# Patient Record
Sex: MALE | Race: WHITE | NOT HISPANIC OR LATINO | Employment: OTHER | URBAN - METROPOLITAN AREA
[De-identification: names, ages, dates, MRNs, and addresses within clinical notes are randomized per-mention and may not be internally consistent; named-entity substitution may affect disease eponyms.]

---

## 2020-05-04 ENCOUNTER — CONSULT (OUTPATIENT)
Dept: CARDIOLOGY | Facility: CLINIC | Age: 70
End: 2020-05-04

## 2020-05-04 VITALS
HEART RATE: 68 BPM | SYSTOLIC BLOOD PRESSURE: 130 MMHG | TEMPERATURE: 97.8 F | BODY MASS INDEX: 30.77 KG/M2 | WEIGHT: 227.2 LBS | DIASTOLIC BLOOD PRESSURE: 74 MMHG | HEIGHT: 72 IN

## 2020-05-04 DIAGNOSIS — I10 ESSENTIAL HYPERTENSION: ICD-10-CM

## 2020-05-04 DIAGNOSIS — I42.8 NICM (NONISCHEMIC CARDIOMYOPATHY) (HCC): ICD-10-CM

## 2020-05-04 DIAGNOSIS — I44.7 LBBB (LEFT BUNDLE BRANCH BLOCK): ICD-10-CM

## 2020-05-04 DIAGNOSIS — R55 NEAR SYNCOPE: Primary | ICD-10-CM

## 2020-05-04 PROCEDURE — 99203 OFFICE O/P NEW LOW 30 MIN: CPT | Performed by: INTERNAL MEDICINE

## 2020-05-04 PROCEDURE — 93000 ELECTROCARDIOGRAM COMPLETE: CPT | Performed by: INTERNAL MEDICINE

## 2020-05-04 RX ORDER — SODIUM PHOSPHATE,MONO-DIBASIC 19G-7G/118
ENEMA (ML) RECTAL
COMMUNITY

## 2020-05-04 RX ORDER — LISINOPRIL 20 MG/1
20 TABLET ORAL DAILY
COMMUNITY

## 2020-05-04 RX ORDER — TAMSULOSIN HYDROCHLORIDE 0.4 MG/1
1 CAPSULE ORAL DAILY
COMMUNITY

## 2020-05-04 RX ORDER — MULTIPLE VITAMINS W/ MINERALS TAB 9MG-400MCG
1 TAB ORAL DAILY
COMMUNITY

## 2020-05-04 RX ORDER — METOPROLOL SUCCINATE 100 MG/1
50 TABLET, EXTENDED RELEASE ORAL DAILY
COMMUNITY

## 2020-05-04 RX ORDER — FINASTERIDE 5 MG/1
5 TABLET, FILM COATED ORAL DAILY
COMMUNITY

## 2020-05-04 RX ORDER — DICLOFENAC SODIUM AND MISOPROSTOL 75; 200 MG/1; UG/1
1 TABLET, DELAYED RELEASE ORAL 2 TIMES DAILY
COMMUNITY

## 2020-05-04 RX ORDER — ASCORBIC ACID 500 MG
500 TABLET ORAL DAILY
COMMUNITY

## 2020-05-04 NOTE — PROGRESS NOTES
Subjective:     Encounter Date:05/04/2020    Primary Care Physician: Shemar Novoa MD      Patient ID: Luis Alfredo Kc is a 69 y.o. male.    Chief Complaint:Cardiomyopathy    PROBLEM LIST:  1. NICM  a. 2/2010 [facility] EF 25%. Normal coronary arteries.  b. 7/2012 EF 40-45%.  2. Chronic left bundle branch block  3. Recurrent near syncope  a. 48 hour holter pending  4. Hypertension  5. Arthritis  6. Anxiety  7. BPH/Prostatitis   8. Surgeries:  a. Tonsillectomy       No Known Allergies      Current Outpatient Medications:   •  diclofenac-miSOPROStol (ARTHROTEC 75) 75-0.2 MG EC tablet, Take 1 tablet by mouth 2 (Two) Times a Day., Disp: , Rfl:   •  finasteride (PROSCAR) 5 MG tablet, Take 5 mg by mouth Daily., Disp: , Rfl:   •  glucosamine-chondroitin 500-400 MG capsule capsule, Take  by mouth 3 (Three) Times a Day With Meals., Disp: , Rfl:   •  lisinopril (PRINIVIL,ZESTRIL) 20 MG tablet, Take 20 mg by mouth Daily., Disp: , Rfl:   •  metoprolol succinate XL (TOPROL-XL) 100 MG 24 hr tablet, Take 50 mg by mouth Daily., Disp: , Rfl:   •  Multiple Vitamins-Minerals (MULTIVITAMIN WITH MINERALS) tablet tablet, Take 1 tablet by mouth Daily., Disp: , Rfl:   •  tamsulosin (FLOMAX) 0.4 MG capsule 24 hr capsule, Take 1 capsule by mouth Daily., Disp: , Rfl:   •  vitamin C (ASCORBIC ACID) 500 MG tablet, Take 500 mg by mouth Daily., Disp: , Rfl:         History of Present Illness    Patient is a 69-year-old  male who we are seeing today for further evaluation of recurrent near syncopal events.  He has previous history of nonischemic cardiomyopathy with most recent echocardiogram he thinks around 2 years ago with near normal LVEF.  He also has history of noted chronic left bundle branch block.  Patient notes recently he was in [place] and diagnosed with prostatitis.  With this some of his medication was adjusted and his finasteride was discontinued.  Once returning back to [place] he followed up with his primary care physician  who reinitiated this medication.  He notes few days after this he was sitting at his computer and felt as if he possibly dozed off or passed out.  Patient notes that he felt this had just fall forward and then reawoke.  He is unsure what happened.  Does not really note any pre-occurring symptoms.  Patient notes that about a week later while he was standing he had a recurrent episode similar to this.  He then followed up with his primary care physician for further evaluation.  He was evaluated via telehealth.  And his metoprolol was decreased to half of his regular dose.  Patient notes since that time blood pressure has been somewhat elevated.  He is also had some recurrent lower abdominal discomfort and complaints of constipation.  Patient notes that last evening his blood pressure spiked with systolic of 199.  He contacted EMS who came to his house.  By that time his systolic blood pressure was down to 160.  He felt back to his baseline.  He remained home and did not pursue further evaluation as he knew that he had this appointment today.  He denies any chest pain, pressure, tightness.  Denies any increased shortness of breath.  Denies any recent edema.  He has not noted any bradycardia when checking his blood pressure at home.  Patient was given a 48-hour Holter by his primary care physician which he just returned on Saturday and we do not currently have results of.  Patient notes that he did not have any near syncopal symptoms while wearing the monitor.    The following portions of the patient's history were reviewed and updated as appropriate: allergies, current medications, past family history, past medical history, past social history, past surgical history and problem list.    Family History   Problem Relation Age of Onset   • Heart disease Mother    • Arrhythmia Mother    • Heart disease Brother    • Arrhythmia Brother        Social History     Tobacco Use   • Smoking status: Former Smoker     Years: 30.00  "    Last attempt to quit: 1986     Years since quittin.0   • Smokeless tobacco: Never Used   Substance Use Topics   • Alcohol use: Not Currently   • Drug use: Never         Review of Systems   Constitution: Negative for fever and malaise/fatigue.   HENT: Negative for nosebleeds.    Eyes: Negative for redness and visual disturbance.   Cardiovascular: Negative for chest pain, orthopnea, palpitations and paroxysmal nocturnal dyspnea.   Respiratory: Positive for snoring. Negative for cough, sputum production and wheezing.    Hematologic/Lymphatic: Negative for bleeding problem.   Skin: Negative for flushing, itching and rash.   Musculoskeletal: Positive for arthritis and joint pain. Negative for falls and muscle cramps.   Gastrointestinal: Positive for abdominal pain, constipation and nausea. Negative for diarrhea, heartburn and vomiting.   Genitourinary: Negative for hematuria.   Neurological: Negative for excessive daytime sleepiness, dizziness, headaches, tremors and weakness.   Psychiatric/Behavioral: Negative for substance abuse. The patient is not nervous/anxious.           Objective:   /74 (BP Location: Right arm, Patient Position: Sitting)   Pulse 68   Temp 97.8 °F (36.6 °C)   Ht 181.6 cm (71.5\")   Wt 103 kg (227 lb 3.2 oz)   BMI 31.25 kg/m²         Physical Exam   Constitutional: He is oriented to person, place, and time. He appears well-developed and well-nourished. No distress.   HENT:   Head: Normocephalic and atraumatic.   Eyes: Right eye exhibits no discharge. Left eye exhibits no discharge.   Neck: No JVD present. No tracheal deviation present.   Cardiovascular: Normal rate, regular rhythm, normal heart sounds and intact distal pulses. Exam reveals no friction rub.   No murmur heard.  Pulmonary/Chest: Effort normal and breath sounds normal. No respiratory distress.   Abdominal: Soft. Bowel sounds are normal. There is no tenderness.   Musculoskeletal: He exhibits no edema or deformity. "   Neurological: He is alert and oriented to person, place, and time.   Skin: Skin is warm and dry.         ECG 12 Lead  Date/Time: 5/4/2020 9:48 AM  Performed by: Michelet Polk MD  Authorized by: Michelet Polk MD   Comparison: compared with previous ECG from 4/9/2018  Similar to previous ECG  Rhythm: sinus rhythm  Conduction: left bundle branch block    Clinical impression: abnormal EKG                  Assessment:   Assessment/Plan      Luis Alfredo was seen today for cardiomyopathy.    Diagnoses and all orders for this visit:    Near syncope  -     ECG 12 Lead    LBBB (left bundle branch block)    NICM (nonischemic cardiomyopathy) (CMS/[facility])    Essential hypertension      Assessment:  1. Near syncope, unclear etiology. Known LBBB and on beta blocker that was recently reduced. Could be related to recent medication adjustments.  2. Left bundle branch block, chronic  3. History of nonischemic cardiomyopathy, by report last echo with near normal LVEF.  4. Hypertension, beta blocker recently reduced with recent episode of significant hypertension in setting of significant pain.      Plan:  1. Will attempt to get Holter report once resulted. Currently not available.  2. Will check echo in the near term to evaluate LVEF.  3. If no abnormal findings on Holter and patient has recurrent symptoms will attempt to get 30 day MCOT.  4. Continue current medications per current dosing.  5. Follow-up in 3 months time or sooner if needed.       Madelin WHALEY scribed this dictation for Dr. Michelet Polk.    Dictated utilizing Dragon dictation

## 2020-08-11 DIAGNOSIS — I42.8 NICM (NONISCHEMIC CARDIOMYOPATHY) (HCC): Primary | ICD-10-CM

## 2020-08-12 ENCOUNTER — OFFICE VISIT (OUTPATIENT)
Dept: CARDIOLOGY | Facility: CLINIC | Age: 70
End: 2020-08-12

## 2020-08-12 VITALS
SYSTOLIC BLOOD PRESSURE: 136 MMHG | OXYGEN SATURATION: 95 % | DIASTOLIC BLOOD PRESSURE: 72 MMHG | HEART RATE: 79 BPM | HEIGHT: 71 IN | BODY MASS INDEX: 31.78 KG/M2 | WEIGHT: 227 LBS

## 2020-08-12 DIAGNOSIS — I10 ESSENTIAL HYPERTENSION: ICD-10-CM

## 2020-08-12 DIAGNOSIS — I42.8 NICM (NONISCHEMIC CARDIOMYOPATHY) (HCC): Primary | ICD-10-CM

## 2020-08-12 DIAGNOSIS — R06.09 DYSPNEA ON EXERTION: ICD-10-CM

## 2020-08-12 DIAGNOSIS — I44.7 LBBB (LEFT BUNDLE BRANCH BLOCK): ICD-10-CM

## 2020-08-12 PROCEDURE — 99214 OFFICE O/P EST MOD 30 MIN: CPT | Performed by: NURSE PRACTITIONER

## 2020-08-12 NOTE — PROGRESS NOTES
Subjective:     Encounter Date:08/12/2020    Primary Care Physician: Shemar Novoa MD      Patient ID: Luis Alfredo Kc is a 69 y.o. male.    Chief Complaint:Follow-up    PROBLEM LIST:  1. NICM  a. 2/2010 [facility] EF 25%. Normal coronary arteries.  b. 7/2012 EF 40-45%.  2. Chronic left bundle branch block  3. Recurrent near syncope  a. 48 hour holter normal  4. Hypertension  5. Arthritis  6. Anxiety  7. BPH/Prostatitis   8. Surgeries:  a. Tonsillectomy       No Known Allergies      Current Outpatient Medications:   •  diclofenac-miSOPROStol (ARTHROTEC 75) 75-0.2 MG EC tablet, Take 1 tablet by mouth 2 (Two) Times a Day., Disp: , Rfl:   •  finasteride (PROSCAR) 5 MG tablet, Take 5 mg by mouth Daily., Disp: , Rfl:   •  glucosamine-chondroitin 500-400 MG capsule capsule, Take  by mouth 3 (Three) Times a Day With Meals., Disp: , Rfl:   •  lisinopril (PRINIVIL,ZESTRIL) 20 MG tablet, Take 20 mg by mouth Daily., Disp: , Rfl:   •  metoprolol succinate XL (TOPROL-XL) 100 MG 24 hr tablet, Take 50 mg by mouth Daily., Disp: , Rfl:   •  Multiple Vitamins-Minerals (MULTIVITAMIN WITH MINERALS) tablet tablet, Take 1 tablet by mouth Daily., Disp: , Rfl:   •  tamsulosin (FLOMAX) 0.4 MG capsule 24 hr capsule, Take 1 capsule by mouth Daily., Disp: , Rfl:   •  vitamin C (ASCORBIC ACID) 500 MG tablet, Take 500 mg by mouth Daily., Disp: , Rfl:         History of Present Illness    Patient is a 69-year-old  male who is seen today for follow-up of near syncope.  Since last being seen he had a Holter monitor which resulted as normal.  He has had no further episodes of near syncope.  Denies any chest pain, pressure, tightness.  Does note some increased shortness of breath within the last few months.  Notes that he routinely walks 4 miles but is began to notice some shortness of breath with inclines and stairs.  Denies any worsened edema.  Has not had an ischemic evaluation in roughly 10 years.  Echocardiogram has been ordered but not  "yet performed.  Patient complains also of a dry cough over the course of the last couple of years.    The following portions of the patient's history were reviewed and updated as appropriate: allergies, current medications, past family history, past medical history, past social history, past surgical history and problem list.      Social History     Tobacco Use   • Smoking status: Former Smoker     Years: 30.00     Last attempt to quit: 1986     Years since quittin.2   • Smokeless tobacco: Never Used   Substance Use Topics   • Alcohol use: Not Currently   • Drug use: Never         Review of Systems   Constitution: Negative.   HENT: Positive for hearing loss.    Eyes: Positive for blurred vision.   Cardiovascular: Positive for dyspnea on exertion. Negative for chest pain, leg swelling, palpitations and syncope.   Respiratory: Positive for cough and shortness of breath.    Endocrine: Positive for polyuria.   Hematologic/Lymphatic: Negative for bleeding problem. Bruises/bleeds easily.   Skin: Negative for rash.   Musculoskeletal: Positive for arthritis and joint pain. Negative for muscle weakness and myalgias.   Gastrointestinal: Negative for heartburn, nausea and vomiting.   Neurological: Negative for dizziness, light-headedness, loss of balance and numbness.          Objective:   /72 (BP Location: Right arm)   Pulse 79   Ht 180.3 cm (71\")   Wt 103 kg (227 lb)   SpO2 95%   BMI 31.66 kg/m²         Physical Exam   Constitutional: He is oriented to person, place, and time. He appears well-developed and well-nourished. No distress.   Neck: No JVD present. No tracheal deviation present.   Cardiovascular: Normal rate, regular rhythm, normal heart sounds and intact distal pulses. Exam reveals no friction rub.   No murmur heard.  Pulmonary/Chest: Effort normal and breath sounds normal. No respiratory distress.   Abdominal: Soft. Bowel sounds are normal. There is no tenderness.   Musculoskeletal: He " exhibits no edema or deformity.   Neurological: He is alert and oriented to person, place, and time.   Skin: Skin is warm and dry.       Procedures          Assessment:   Assessment/Plan      Luis Alfredo was seen today for follow-up.    Diagnoses and all orders for this visit:    NICM (nonischemic cardiomyopathy) (CMS/HCC), echo pending.  No current signs of heart failure.  On beta-blocker.    Dyspnea on exertion, slightly worsening with exertion.  No resting dyspnea.    LBBB (left bundle branch block), chronic.    Essential hypertension, controlled on ACE inhibitor.      Plan:  1. Continue current medications.  2. Given risk factors, history of cardiomyopathy and new dyspnea on exertion will proceed to myocardial perfusion study in the near term.  3. Keep echocardiogram as scheduled to follow-up on previously known decreased LVEF.  4. As syncope is resolved we will not proceed with any further Holter monitoring at this time unless it recurs.  5. Follow-up in 6 months time or sooner if needed.       JUDD Ramsay     Dictated utilizing Dragon dictation

## 2020-09-09 ENCOUNTER — OUTSIDE FACILITY SERVICE (OUTPATIENT)
Dept: CARDIOLOGY | Facility: CLINIC | Age: 70
End: 2020-09-09

## 2020-09-09 PROCEDURE — 93018 CV STRESS TEST I&R ONLY: CPT | Performed by: INTERNAL MEDICINE

## 2020-09-09 PROCEDURE — 78452 HT MUSCLE IMAGE SPECT MULT: CPT | Performed by: INTERNAL MEDICINE

## 2020-09-09 PROCEDURE — 93016 CV STRESS TEST SUPVJ ONLY: CPT | Performed by: NURSE PRACTITIONER

## 2020-09-11 ENCOUNTER — TELEPHONE (OUTPATIENT)
Dept: CARDIOLOGY | Facility: CLINIC | Age: 70
End: 2020-09-11

## 2020-09-11 NOTE — TELEPHONE ENCOUNTER
Left VM requesting patient to call office to discuss results and recommendations below.    ----- Message from Michelet Polk MD sent at 9/10/2020  3:40 PM EDT -----  Please notify patient that his stress test result from [place] was abnormal, and he may have developed coronary artery disease.  His LVEF is still severely decreased.  would be best served with a left heart catheterization if patient agrees.  Please schedule if he is willing

## 2020-09-14 ENCOUNTER — TELEPHONE (OUTPATIENT)
Dept: CARDIOLOGY | Facility: CLINIC | Age: 70
End: 2020-09-14

## 2020-09-14 DIAGNOSIS — R94.39 ABNORMAL STRESS TEST: Primary | ICD-10-CM

## 2020-09-14 NOTE — TELEPHONE ENCOUNTER
Spoke with patient, advised stress test results,(done at ) indicate LHC is needed.  Patient agreeable and aware scheduling will contact to set up.

## 2020-10-21 ENCOUNTER — APPOINTMENT (OUTPATIENT)
Dept: CARDIOLOGY | Facility: HOSPITAL | Age: 70
End: 2020-10-21